# Patient Record
Sex: FEMALE | Race: WHITE | NOT HISPANIC OR LATINO | URBAN - METROPOLITAN AREA
[De-identification: names, ages, dates, MRNs, and addresses within clinical notes are randomized per-mention and may not be internally consistent; named-entity substitution may affect disease eponyms.]

---

## 2017-01-14 ENCOUNTER — OUTPATIENT (OUTPATIENT)
Dept: OUTPATIENT SERVICES | Facility: HOSPITAL | Age: 82
LOS: 1 days | End: 2017-01-14
Payer: MEDICARE

## 2017-01-14 DIAGNOSIS — T81.89XD OTHER COMPLICATIONS OF PROCEDURES, NOT ELSEWHERE CLASSIFIED, SUBSEQUENT ENCOUNTER: ICD-10-CM

## 2017-01-14 DIAGNOSIS — Z90.710 ACQUIRED ABSENCE OF BOTH CERVIX AND UTERUS: Chronic | ICD-10-CM

## 2017-01-14 DIAGNOSIS — K46.9 UNSPECIFIED ABDOMINAL HERNIA WITHOUT OBSTRUCTION OR GANGRENE: Chronic | ICD-10-CM

## 2017-01-14 PROCEDURE — G0463: CPT

## 2017-01-17 DIAGNOSIS — Z79.899 OTHER LONG TERM (CURRENT) DRUG THERAPY: ICD-10-CM

## 2017-01-17 DIAGNOSIS — Z85.41 PERSONAL HISTORY OF MALIGNANT NEOPLASM OF CERVIX UTERI: ICD-10-CM

## 2017-01-17 DIAGNOSIS — Z88.0 ALLERGY STATUS TO PENICILLIN: ICD-10-CM

## 2017-01-17 DIAGNOSIS — Z90.710 ACQUIRED ABSENCE OF BOTH CERVIX AND UTERUS: ICD-10-CM

## 2017-01-17 DIAGNOSIS — Z87.891 PERSONAL HISTORY OF NICOTINE DEPENDENCE: ICD-10-CM

## 2017-01-17 DIAGNOSIS — Z79.82 LONG TERM (CURRENT) USE OF ASPIRIN: ICD-10-CM

## 2017-01-17 DIAGNOSIS — Z98.890 OTHER SPECIFIED POSTPROCEDURAL STATES: ICD-10-CM

## 2017-01-17 DIAGNOSIS — Z87.19 PERSONAL HISTORY OF OTHER DISEASES OF THE DIGESTIVE SYSTEM: ICD-10-CM

## 2017-01-17 DIAGNOSIS — Z91.048 OTHER NONMEDICINAL SUBSTANCE ALLERGY STATUS: ICD-10-CM

## 2017-01-17 DIAGNOSIS — I10 ESSENTIAL (PRIMARY) HYPERTENSION: ICD-10-CM

## 2017-01-17 DIAGNOSIS — K43.2 INCISIONAL HERNIA WITHOUT OBSTRUCTION OR GANGRENE: ICD-10-CM

## 2017-01-17 DIAGNOSIS — H35.30 UNSPECIFIED MACULAR DEGENERATION: ICD-10-CM
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
## 2017-01-17 DIAGNOSIS — T81.89XD OTHER COMPLICATIONS OF PROCEDURES, NOT ELSEWHERE CLASSIFIED, SUBSEQUENT ENCOUNTER: ICD-10-CM

## 2017-01-17 DIAGNOSIS — E03.9 HYPOTHYROIDISM, UNSPECIFIED: ICD-10-CM

## 2017-01-17 DIAGNOSIS — Z82.49 FAMILY HISTORY OF ISCHEMIC HEART DISEASE AND OTHER DISEASES OF THE CIRCULATORY SYSTEM: ICD-10-CM

## 2017-01-17 DIAGNOSIS — F34.1 DYSTHYMIC DISORDER: ICD-10-CM

## 2017-01-17 DIAGNOSIS — Z85.79 PERSONAL HISTORY OF OTHER MALIGNANT NEOPLASMS OF LYMPHOID, HEMATOPOIETIC AND RELATED TISSUES: ICD-10-CM

## 2018-10-07 ENCOUNTER — EMERGENCY (EMERGENCY)
Facility: HOSPITAL | Age: 83
LOS: 1 days | Discharge: NURSING, REHAB OR LONG-TERM CARE | End: 2018-10-07
Attending: EMERGENCY MEDICINE | Admitting: EMERGENCY MEDICINE
Payer: MEDICARE

## 2018-10-07 VITALS
SYSTOLIC BLOOD PRESSURE: 146 MMHG | HEART RATE: 88 BPM | TEMPERATURE: 98 F | HEIGHT: 70 IN | WEIGHT: 147.05 LBS | OXYGEN SATURATION: 99 % | RESPIRATION RATE: 12 BRPM | DIASTOLIC BLOOD PRESSURE: 80 MMHG

## 2018-10-07 VITALS
OXYGEN SATURATION: 95 % | DIASTOLIC BLOOD PRESSURE: 87 MMHG | HEART RATE: 71 BPM | SYSTOLIC BLOOD PRESSURE: 151 MMHG | TEMPERATURE: 99 F | RESPIRATION RATE: 13 BRPM

## 2018-10-07 DIAGNOSIS — Z90.710 ACQUIRED ABSENCE OF BOTH CERVIX AND UTERUS: Chronic | ICD-10-CM

## 2018-10-07 DIAGNOSIS — K46.9 UNSPECIFIED ABDOMINAL HERNIA WITHOUT OBSTRUCTION OR GANGRENE: Chronic | ICD-10-CM

## 2018-10-07 PROCEDURE — 99284 EMERGENCY DEPT VISIT MOD MDM: CPT

## 2018-10-07 PROCEDURE — 70450 CT HEAD/BRAIN W/O DYE: CPT | Mod: 26

## 2018-10-07 PROCEDURE — 71101 X-RAY EXAM UNILAT RIBS/CHEST: CPT | Mod: 26

## 2018-10-07 PROCEDURE — 70450 CT HEAD/BRAIN W/O DYE: CPT

## 2018-10-07 PROCEDURE — 99284 EMERGENCY DEPT VISIT MOD MDM: CPT | Mod: 25

## 2018-10-07 PROCEDURE — 71101 X-RAY EXAM UNILAT RIBS/CHEST: CPT

## 2018-10-07 RX ORDER — ACETAMINOPHEN 500 MG
650 TABLET ORAL ONCE
Qty: 0 | Refills: 0 | Status: COMPLETED | OUTPATIENT
Start: 2018-10-07 | End: 2018-10-07

## 2018-10-07 RX ADMIN — Medication 650 MILLIGRAM(S): at 21:43

## 2018-10-07 NOTE — ED PROVIDER NOTE - CHPI ED SYMPTOMS NEG
no dizziness/no confusion/no change in level of consciousness/no vomiting/no blurred vision/no loss of consciousness/no nausea

## 2018-10-07 NOTE — ED PROVIDER NOTE - MEDICAL DECISION MAKING DETAILS
small hematoma to back of head after fall. denies headache or dizziness. does not take any blood thinners. will head CT. will also x-ray ribs due to mild rib tenderness. no pain at rest. declines pain meds

## 2018-10-07 NOTE — ED PROVIDER NOTE - ATTENDING CONTRIBUTION TO CARE
89 y.o. F [facility] fall 89 y.o. F [facility] for fall, had finished dinner, was trying to get up, pushed chair back and slipped, fell back and hit head, no LOC, no n/v, at baseline mental status, c/o pain back of head and left side rib cage, hurts to take a deep breath, no other complaints; on exam pt is wd, wn, nad; heent - hematoma posterior scalp, no sign facial trauma, PERRL, EOMI; chest - cta, no w/r/r; cv - rrr, no m/r/g; msk - no spinal TTP, no extremity ttp, FROM, normal gait, +ttp left lateral lower chest wall, no crepitus; skin - intact; psych - calm, cooperative; neuro - A&O, follows commands, motor/sensation intact; A/P - fall with hematoma to scalp and bruising to left ribs - ct head negative, cxr negative - d/w family official xr read will be done in the morning, will take tylenol prn, f/u pcp

## 2018-10-07 NOTE — ED PROVIDER NOTE - PROGRESS NOTE DETAILS
resting comfortably. waiting for CT results. no complaints Reevaluated patient at bedside.  no complaints.  Discussed the results of all diagnostic testing in ED and copies of all reports given.   An opportunity to ask questions was given.  Discussed the importance of prompt, close medical follow-up.  Patient will return with any changes, concerns or persistent / worsening symptoms.  Understanding of all instructions verbalized.

## 2018-10-07 NOTE — ED PROVIDER NOTE - CARDIAC, MLM
Normal rate, regular rhythm. mild well localized left sided rib tenderness. no crepitus or ecchymosis

## 2018-10-07 NOTE — ED PROVIDER NOTE - CARE PLAN
Principal Discharge DX:	Closed head injury, initial encounter  Secondary Diagnosis:	Rib contusion, left, initial encounter

## 2018-10-11 DIAGNOSIS — S00.93XA CONTUSION OF UNSPECIFIED PART OF HEAD, INITIAL ENCOUNTER: ICD-10-CM

## 2020-06-24 ENCOUNTER — EMERGENCY (EMERGENCY)
Facility: HOSPITAL | Age: 85
LOS: 1 days | Discharge: HOME | End: 2020-06-24
Attending: EMERGENCY MEDICINE | Admitting: EMERGENCY MEDICINE
Payer: MEDICARE

## 2020-06-24 VITALS
HEART RATE: 100 BPM | HEIGHT: 62 IN | TEMPERATURE: 98 F | SYSTOLIC BLOOD PRESSURE: 144 MMHG | DIASTOLIC BLOOD PRESSURE: 78 MMHG | WEIGHT: 110.01 LBS | RESPIRATION RATE: 18 BRPM | OXYGEN SATURATION: 97 %

## 2020-06-24 DIAGNOSIS — K46.9 UNSPECIFIED ABDOMINAL HERNIA WITHOUT OBSTRUCTION OR GANGRENE: Chronic | ICD-10-CM

## 2020-06-24 DIAGNOSIS — Z90.710 ACQUIRED ABSENCE OF BOTH CERVIX AND UTERUS: Chronic | ICD-10-CM

## 2020-06-24 LAB
ALBUMIN SERPL ELPH-MCNC: 3.3 G/DL — SIGNIFICANT CHANGE UP (ref 3.3–5)
ALP SERPL-CCNC: 71 U/L — SIGNIFICANT CHANGE UP (ref 40–120)
ALT FLD-CCNC: 14 U/L — SIGNIFICANT CHANGE UP (ref 12–78)
ANION GAP SERPL CALC-SCNC: 8 MMOL/L — SIGNIFICANT CHANGE UP (ref 5–17)
AST SERPL-CCNC: 12 U/L — LOW (ref 15–37)
BILIRUB SERPL-MCNC: 0.5 MG/DL — SIGNIFICANT CHANGE UP (ref 0.2–1.2)
BUN SERPL-MCNC: 11 MG/DL — SIGNIFICANT CHANGE UP (ref 7–23)
CALCIUM SERPL-MCNC: 8.7 MG/DL — SIGNIFICANT CHANGE UP (ref 8.5–10.1)
CHLORIDE SERPL-SCNC: 107 MMOL/L — SIGNIFICANT CHANGE UP (ref 96–108)
CK SERPL-CCNC: 23 U/L — LOW (ref 26–192)
CO2 SERPL-SCNC: 25 MMOL/L — SIGNIFICANT CHANGE UP (ref 22–31)
CREAT SERPL-MCNC: 0.58 MG/DL — SIGNIFICANT CHANGE UP (ref 0.5–1.3)
GLUCOSE SERPL-MCNC: 111 MG/DL — HIGH (ref 70–99)
HCT VFR BLD CALC: 34.6 % — SIGNIFICANT CHANGE UP (ref 34.5–45)
HGB BLD-MCNC: 10.4 G/DL — LOW (ref 11.5–15.5)
MAGNESIUM SERPL-MCNC: 1.5 MG/DL — LOW (ref 1.6–2.6)
MCHC RBC-ENTMCNC: 23.2 PG — LOW (ref 27–34)
MCHC RBC-ENTMCNC: 30.1 GM/DL — LOW (ref 32–36)
MCV RBC AUTO: 77.1 FL — LOW (ref 80–100)
NRBC # BLD: 0 /100 WBCS — SIGNIFICANT CHANGE UP (ref 0–0)
PLATELET # BLD AUTO: 74 K/UL — LOW (ref 150–400)
POTASSIUM SERPL-MCNC: 4.3 MMOL/L — SIGNIFICANT CHANGE UP (ref 3.5–5.3)
POTASSIUM SERPL-SCNC: 4.3 MMOL/L — SIGNIFICANT CHANGE UP (ref 3.5–5.3)
PROT SERPL-MCNC: 6.8 G/DL — SIGNIFICANT CHANGE UP (ref 6–8.3)
RBC # BLD: 4.49 M/UL — SIGNIFICANT CHANGE UP (ref 3.8–5.2)
RBC # FLD: 24 % — HIGH (ref 10.3–14.5)
SODIUM SERPL-SCNC: 140 MMOL/L — SIGNIFICANT CHANGE UP (ref 135–145)
TSH SERPL-MCNC: 0.84 UIU/ML — SIGNIFICANT CHANGE UP (ref 0.36–3.74)
WBC # BLD: 4.92 K/UL — SIGNIFICANT CHANGE UP (ref 3.8–10.5)
WBC # FLD AUTO: 4.92 K/UL — SIGNIFICANT CHANGE UP (ref 3.8–10.5)

## 2020-06-24 PROCEDURE — 84480 ASSAY TRIIODOTHYRONINE (T3): CPT

## 2020-06-24 PROCEDURE — 72125 CT NECK SPINE W/O DYE: CPT

## 2020-06-24 PROCEDURE — 99284 EMERGENCY DEPT VISIT MOD MDM: CPT

## 2020-06-24 PROCEDURE — 72125 CT NECK SPINE W/O DYE: CPT | Mod: 26

## 2020-06-24 PROCEDURE — 99285 EMERGENCY DEPT VISIT HI MDM: CPT

## 2020-06-24 PROCEDURE — 84436 ASSAY OF TOTAL THYROXINE: CPT

## 2020-06-24 PROCEDURE — 70450 CT HEAD/BRAIN W/O DYE: CPT

## 2020-06-24 PROCEDURE — 93005 ELECTROCARDIOGRAM TRACING: CPT

## 2020-06-24 PROCEDURE — 82550 ASSAY OF CK (CPK): CPT

## 2020-06-24 PROCEDURE — 36415 COLL VENOUS BLD VENIPUNCTURE: CPT

## 2020-06-24 PROCEDURE — 99284 EMERGENCY DEPT VISIT MOD MDM: CPT | Mod: 25

## 2020-06-24 PROCEDURE — 80053 COMPREHEN METABOLIC PANEL: CPT

## 2020-06-24 PROCEDURE — 85027 COMPLETE CBC AUTOMATED: CPT

## 2020-06-24 PROCEDURE — 93010 ELECTROCARDIOGRAM REPORT: CPT

## 2020-06-24 PROCEDURE — 71250 CT THORAX DX C-: CPT | Mod: 26

## 2020-06-24 PROCEDURE — 84443 ASSAY THYROID STIM HORMONE: CPT

## 2020-06-24 PROCEDURE — 83735 ASSAY OF MAGNESIUM: CPT

## 2020-06-24 PROCEDURE — 71250 CT THORAX DX C-: CPT

## 2020-06-24 PROCEDURE — 70450 CT HEAD/BRAIN W/O DYE: CPT | Mod: 26

## 2020-06-24 RX ORDER — ACETAMINOPHEN 500 MG
650 TABLET ORAL ONCE
Refills: 0 | Status: DISCONTINUED | OUTPATIENT
Start: 2020-06-24 | End: 2020-06-28

## 2020-06-24 NOTE — ED ADULT NURSE NOTE - OBJECTIVE STATEMENT
patient comes to ED from Assisted Living Facility awake alert oriented to person place situation patient reports falling was walking with her walker in her room and lost her footing and fell pt has hematoma to top of head denies any other injuries moves all extremities well denies loss of consciousness

## 2020-06-24 NOTE — ED ADULT NURSE NOTE - NSIMPLEMENTINTERV_GEN_ALL_ED
Implemented All Fall with Harm Risk Interventions:  [place] to call system. Call bell, personal items and telephone within reach. Instruct patient to call for assistance. Room bathroom lighting operational. Non-slip footwear when patient is off stretcher. Physically safe environment: no spills, clutter or unnecessary equipment. Stretcher in lowest position, wheels locked, appropriate side rails in place. Provide visual cue, wrist band, yellow gown, etc. Monitor gait and stability. Monitor for mental status changes and reorient to person, place, and time. Review medications for side effects contributing to fall risk. Reinforce activity limits and safety measures with patient and family. Provide visual clues: red socks.

## 2020-06-24 NOTE — ED PROVIDER NOTE - CARE PROVIDER_API CALL
Carlos Eduardo Daniel  INTERNAL MEDICINE  [address] [place] [address]  Phone: [phone]  Fax: [phone]  Follow Up Time:

## 2020-06-24 NOTE — ED PROVIDER NOTE - OBJECTIVE STATEMENT
89 yo female with h/o HTN, ESRD, OA from [facility]IB[facility] s/p fall today. As per RN at assisted living [facility] patient had an unwitnessed fall, + head trauma, unknown LOC. PAtient was found by aide sitting on floor next to her chair, on floor approx 1 hour. As per patient she tripped and fell. Patient c/o right posterior rib pain. Denies headache, dizziness, visual changes, chest pain, sob, abd pain, N/V, UE/LE weakness or paresthesias.  No lacerations or abrasions. + hematoma to scalp. As per EMS - ekg shows A fib, no known history of a fib     pmd: Dr. Russell

## 2020-06-24 NOTE — CONSULT NOTE ADULT - SUBJECTIVE AND OBJECTIVE BOX
[facility] - Juan Diego Manjarrez, Yan Gonzales, María, Luis, Shiraz Tran  Office Number: [phone]    Initial Consult Note  CHIEF COMPLAINT: Patient is a 89y old  Female who presents with a chief complaint of fall  HPI: 89 yo PMH HTN, HLD, depression, GERD presented with falls x 2 in assisted nursing facility over the past day. Patient does not remember feeling dizzy or lightheaded. Patient remembers hitting head and does have mild pain on top of head as well as right side of chest. No neck, back or abdominal pains. Denies any chest pain, SOB or syncope.     Allergies  penicillin (Other)    PAST MEDICAL & SURGICAL HISTORY:  Depression  GERD (gastroesophageal reflux disease)  Hypothyroid  Hypertension  Hypertension  Abdominal hernia: surgeries x4  H/O total hysterectomy    MEDICATIONS  (STANDING):    MEDICATIONS  (PRN):    FAMILY HISTORY:  No pertinent family history in first degree relatives    SOCIAL HISTORY  Marital Status:   Occupation:   Lives with:     SUBSTANCE USE  Tobacco Usage:  ( ) None ( ) never smoked   ( ) former smoker  ( ) current smoker; Packs per day:   Alcohol Usage: ( ) none  ( ) occasional ( ) 2-3 times a week ( ) daily; Last drink:   Recreational drugs ( ) None    REVIEW OF SYSTEMS   CONSTITUTIONAL: No fevers, No chills, No fatigue, No weight gain  EYES: No vision changes, No vertigo, No throat pain   ENT: No congestion, No ear pain, No sore throat.  NECK: No pain, No stiffness  RESPIRATORY: No shortness of breath, No cough, No wheezing, No hemoptysis  CARDIOVASCULAR: No chest pain. No palpitations, No PARKER, No orthopnea, No PND, No pleuritic pain  GASTROINTESTINAL: No abdominal pain, No nausea, No vomiting, No hematemesis, No diarrhea No constipation. No melena  GENITOURINARY: No dysuria, No frequency, No incontinence, No hematuria  NEUROLOGICAL: No dizziness, No lightheadedness, No syncope, No LOC, No headache, No numbness, No weakness  MUSCULOSKELETAL: No joint pain, No joint swelling.  PSYCHIATRIC: No anxiety, No depression  DERMATOLOGY: No diaphoresis. No itching, No rashes, No pressure ulcers  HEME/LYMPH: No easy bruising, or bleeding gums  All other review of systems is negative unless indicated above.    VITAL SIGNS:   Vital Signs Last 24 Hrs  T(C): 36.7 (24 Jun 2020 16:02), Max: 36.7 (24 Jun 2020 16:02)  T(F): 98 (24 Jun 2020 16:02), Max: 98 (24 Jun 2020 16:02)  HR: 100 (24 Jun 2020 16:02) (100 - 100)  BP: 144/78 (24 Jun 2020 16:02) (144/78 - 144/78)  BP(mean): --  RR: 18 (24 Jun 2020 16:02) (18 - 18)  SpO2: 97% (24 Jun 2020 16:02) (97% - 97%)    Physical Exam:  Appearance: NAD, no distress, alert,   HEENT: Moist Mucous Membranes, Anicteric  Cardiovascular: Regular rate and rhythm, Normal S1 S2, No JVD, No murmurs, No rubs, gallops or clicks  Respiratory: Lungs clear to auscultation. No rales, No rhonchi, No wheezing. No tenderness to palpation  Gastrointestinal:  Soft, Non-tender, + BS  Neurologic: Non-focal  Skin: Warm and dry, No rashes, No ecchymosis, No cyanosis, No ulcers   Musculoskeletal: No clubbing, No cyanosis  Vascular: Peripheral pulses palpable 2+ bilaterally  Psychiatry: Mood & affect appropriate  Lymph: No peripheral edema.     LABS: [facility] - Juan Diego Manjarrez, Janet, Yan, María, Luis, Shiraz Tran  Office Number: [phone]    Initial Consult Note  CHIEF COMPLAINT: Patient is a 89y old  Female who presents with a chief complaint of fall  HPI: 89 yo PMH HTN, HLD, depression, GERD presented with falls x 2 in assisted nursing facility over the past day. Patient was walking with a walker and fell. Patient does not remember feeling dizzy or lightheaded. She is legally blind and ia [place]. Patient remembers hitting head and does have mild pain on top of head as well as right side of chest. No neck, back or abdominal pains. Denies any chest pain, SOB or syncope. Unsure if fall is mechanical. Patient accompanied by daughter Jeanne who stated her mother was in the living facility and was confused after started on Remeron Remeron is off, but patient would get confused intermittently.   In ER, T(F): 98 HR: 100 BP: 144/78 RR: 18 SpO2: 97% EKG showed Atrial fibrillation @ 100. No h/o A fib per daughter. HR in telemetry ....   Patient on atenolol at [facility] 25 mg. CT head, chest and c-spine pending     Allergies  penicillin (Other)    PAST MEDICAL & SURGICAL HISTORY:  Depression  GERD (gastroesophageal reflux disease)  Hypothyroid  Hypertension  Hypertension  Abdominal hernia: surgeries x4  H/O total hysterectomy    MEDICATIONS  (STANDING):    MEDICATIONS  (PRN):    FAMILY HISTORY:  No pertinent family history in first degree relatives    REVIEW OF SYSTEMS   CONSTITUTIONAL: No fevers, No chills, No fatigue, No weight gain  EYES: No vision changes, No vertigo, No throat pain   ENT: No congestion, No ear pain, No sore throat.  NECK: No pain, No stiffness  RESPIRATORY: No shortness of breath, No cough, No wheezing, No hemoptysis  CARDIOVASCULAR: No chest pain. No palpitations, No PARKER, No orthopnea, No PND, No pleuritic pain  GASTROINTESTINAL: No abdominal pain, No nausea, No vomiting, No hematemesis, No diarrhea No constipation. No melena  GENITOURINARY: No dysuria, No frequency, No incontinence, No hematuria  NEUROLOGICAL: No dizziness, No lightheadedness, No syncope, No LOC, No headache, No numbness, No weakness  MUSCULOSKELETAL: No joint pain, No joint swelling.  PSYCHIATRIC: No anxiety, No depression  DERMATOLOGY: No diaphoresis. No itching, No rashes, No pressure ulcers  HEME/LYMPH: No easy bruising, or bleeding gums  All other review of systems is negative unless indicated above.    VITAL SIGNS:   Vital Signs Last 24 Hrs  T(C): 36.7 (24 Jun 2020 16:02), Max: 36.7 (24 Jun 2020 16:02)  T(F): 98 (24 Jun 2020 16:02), Max: 98 (24 Jun 2020 16:02)  HR: 100 (24 Jun 2020 16:02) (100 - 100)  BP: 144/78 (24 Jun 2020 16:02) (144/78 - 144/78)  BP(mean): --  RR: 18 (24 Jun 2020 16:02) (18 - 18)  SpO2: 97% (24 Jun 2020 16:02) (97% - 97%)    Physical Exam:  Appearance: NAD, no distress, alert,   HEENT: Moist Mucous Membranes, Anicteric  Cardiovascular: Regular rate and rhythm, Normal S1 S2, No JVD, No murmurs, No rubs, gallops or clicks  Respiratory: Lungs clear to auscultation. No rales, No rhonchi, No wheezing. No tenderness to palpation  Gastrointestinal:  Soft, Non-tender, + BS  Neurologic: Non-focal  Skin: Warm and dry, No rashes, No ecchymosis, No cyanosis, No ulcers   Musculoskeletal: No clubbing, No cyanosis  Vascular: Peripheral pulses palpable 2+ bilaterally  Psychiatry: Mood & affect appropriate  Lymph: No peripheral edema.     LABS: [facility] - Juan Diego Manjarrez, Janet, Yan, María, Luis, Chelsea, Shiraz  Office Number: [phone]    Initial Consult Note  CHIEF COMPLAINT: Patient is a 89y old  Female who presents with a chief complaint of fall  HPI: 89 yo PMH HTN, HLD, depression, GERD presented with falls x 2 in assisted nursing facility over the past day. Patient was walking with a walker and fell, unwitnessed. Patient does not remember feeling dizzy or lightheaded. She is legally blind and ia [place]. Patient remembers hitting head and does have mild pain and swelling on top of head as well as pain on right side of chest. No neck, back or abdominal pains. Denies any chest pain, SOB or syncope. Unsure if fall is mechanical. Patient accompanied by daughter Jeanne who stated her mother was in the living facility and was confused after started on Remeron Remeron is off, but patient would get confused intermittently.     In ER, T(F): 98 HR: 100 BP: 144/78 RR: 18 SpO2: 97% EKG showed Atrial fibrillation @ 100. No h/o A fib per daughter. HR in telemetry 80-100s    Patient on atenolol at [facility] 25 mg. CT head, chest and c-spine pending     Allergies  penicillin (Other)    PAST MEDICAL & SURGICAL HISTORY:  Depression  GERD (gastroesophageal reflux disease)  Hypothyroid  Hypertension  Hypertension  Abdominal hernia: surgeries x4  H/O total hysterectomy    MEDICATIONS  (STANDING):    MEDICATIONS  (PRN):    FAMILY HISTORY:  No pertinent family history in first degree relatives    REVIEW OF SYSTEMS   CONSTITUTIONAL: No fevers, No chills, No fatigue, No weight gain  EYES: No vision changes, No vertigo, No throat pain   ENT: No congestion, No ear pain, No sore throat.  NECK: No pain, No stiffness  RESPIRATORY: No shortness of breath, No cough, No wheezing, No hemoptysis  CARDIOVASCULAR: No chest pain. No palpitations, No PARKER, No orthopnea, No PND, No pleuritic pain  GASTROINTESTINAL: No abdominal pain, No nausea, No vomiting, No hematemesis, No diarrhea No constipation. No melena  GENITOURINARY: No dysuria, No frequency, No incontinence, No hematuria  NEUROLOGICAL: No dizziness, No lightheadedness, No syncope, No LOC, No headache, No numbness, No weakness  MUSCULOSKELETAL: No joint pain, No joint swelling.  PSYCHIATRIC: No anxiety, No depression  DERMATOLOGY: No diaphoresis. No itching, No rashes, No pressure ulcers  HEME/LYMPH: No easy bruising, or bleeding gums  All other review of systems is negative unless indicated above.    VITAL SIGNS:   Vital Signs Last 24 Hrs  T(C): 36.7 (24 Jun 2020 16:02), Max: 36.7 (24 Jun 2020 16:02)  T(F): 98 (24 Jun 2020 16:02), Max: 98 (24 Jun 2020 16:02)  HR: 100 (24 Jun 2020 16:02) (100 - 100)  BP: 144/78 (24 Jun 2020 16:02) (144/78 - 144/78)  BP(mean): --  RR: 18 (24 Jun 2020 16:02) (18 - 18)  SpO2: 97% (24 Jun 2020 16:02) (97% - 97%)    Physical Exam:  Appearance: NAD, no distress, alert,   HEENT: Moist Mucous Membranes, Anicteric  Cardiovascular: Regular rate and rhythm, Normal S1 S2, No JVD, No murmurs, No rubs, gallops or clicks  Respiratory: Lungs clear to auscultation. No rales, No rhonchi, No wheezing. No tenderness to palpation  Gastrointestinal:  Soft, Non-tender, + BS  Neurologic: Non-focal  Skin: Warm and dry, No rashes, No ecchymosis, No cyanosis, No ulcers   Musculoskeletal: No clubbing, No cyanosis  Vascular: Peripheral pulses palpable 2+ bilaterally  Psychiatry: Mood & affect appropriate  Lymph: No peripheral edema.     LABS: [facility] - Juan Diego Manjarrez, Janet, Yan, María, Luis, Chelsea, Shiraz  Office Number: [phone]    Initial Consult Note  CHIEF COMPLAINT: Patient is a 89y old  Female who presents with a chief complaint of fall  HPI: 89 yo PMH HTN, HLD, depression, GERD presented with falls x 2 in assisted nursing facility over the past day. Patient was walking with a walker and fell, unwitnessed. Patient does not remember feeling dizzy or lightheaded. She is legally blind and ia [place]. Patient remembers hitting head and does have mild pain and swelling on top of head as well as pain on right side of chest. No neck, back or abdominal pains. Denies any chest pain, SOB or syncope. Unsure if fall is mechanical. Patient accompanied by daughter Jeanne who stated her mother was in the living facility and was confused after started on Remeron Remeron is off, but patient would get confused intermittently.     In ER, T(F): 98 HR: 100 BP: 144/78 RR: 18 SpO2: 97% EKG showed Atrial fibrillation @ 100. No h/o A fib per daughter. HR in telemetry 80-100s    Patient on atenolol at [facility] 25 mg. CT head, chest and c-spine pending     Allergies  penicillin (Other)    PAST MEDICAL & SURGICAL HISTORY:  Depression  GERD (gastroesophageal reflux disease)  Hypothyroid  Hypertension  Hypertension  Abdominal hernia: surgeries x4  H/O total hysterectomy    MEDICATIONS  (STANDING):    MEDICATIONS  (PRN):    FAMILY HISTORY:  No pertinent family history in first degree relatives    REVIEW OF SYSTEMS   CONSTITUTIONAL: No fevers, No chills, No fatigue, No weight gain  EYES: No vision changes, No vertigo, No throat pain   ENT: No congestion, No ear pain, No sore throat.  NECK: No pain, No stiffness  RESPIRATORY: No shortness of breath, No cough, No wheezing, No hemoptysis  CARDIOVASCULAR: No chest pain. No palpitations, No PARKER, No orthopnea, No PND, No pleuritic pain  GASTROINTESTINAL: No abdominal pain, No nausea, No vomiting, No hematemesis, No diarrhea No constipation. No melena  GENITOURINARY: No dysuria, No frequency, No incontinence, No hematuria  NEUROLOGICAL: No dizziness, No lightheadedness, No syncope, No LOC, No headache, No numbness, No weakness  MUSCULOSKELETAL: No joint pain, No joint swelling.  PSYCHIATRIC: No anxiety, No depression  DERMATOLOGY: No diaphoresis. No itching, No rashes, No pressure ulcers  HEME/LYMPH: No easy bruising, or bleeding gums  All other review of systems is negative unless indicated above.    VITAL SIGNS:   Vital Signs Last 24 Hrs  T(C): 36.7 (24 Jun 2020 16:02), Max: 36.7 (24 Jun 2020 16:02)  T(F): 98 (24 Jun 2020 16:02), Max: 98 (24 Jun 2020 16:02)  HR: 100 (24 Jun 2020 16:02) (100 - 100)  BP: 144/78 (24 Jun 2020 16:02) (144/78 - 144/78)  BP(mean): --  RR: 18 (24 Jun 2020 16:02) (18 - 18)  SpO2: 97% (24 Jun 2020 16:02) (97% - 97%)    Physical Exam:  Appearance: NAD, no distress, alert,   HEENT: Moist Mucous Membranes, Anicteric  Cardiovascular: Regular rate and rhythm, Normal S1 S2, No JVD, No murmurs, No rubs, gallops or clicks  Respiratory: Lungs clear to auscultation. No rales, No rhonchi, No wheezing. No tenderness to palpation  Gastrointestinal:  Soft, Non-tender, + BS  Neurologic: Non-focal  Skin: Warm and dry, No rashes, No ecchymosis, No cyanosis, No ulcers   Musculoskeletal: No clubbing, No cyanosis  Vascular: Peripheral pulses palpable 2+ bilaterally  Psychiatry: Mood & affect appropriate  Lymph: No peripheral edema.     LABS:                         10.4   4.92  )-----------( 74       ( 24 Jun 2020 16:24 )             34.6     24 Jun 2020 16:24    140    |  107    |  11     ----------------------------<  111    4.3     |  25     |  0.58     Ca    8.7        24 Jun 2020 16:24  Mg     1.5       24 Jun 2020 16:24    TPro  6.8    /  Alb  3.3    /  TBili  0.5    /  DBili  x      /  AST  12     /  ALT  14     /  AlkPhos  71     24 Jun 2020 16:24    LIVER FUNCTIONS - ( 24 Jun 2020 16:24 )  Alb: 3.3 g/dL / Pro: 6.8 g/dL / ALK PHOS: 71 U/L / ALT: 14 U/L / AST: 12 U/L / GGT: x           CARDIAC MARKERS:  Creatine Kinase, Serum: 23 U/L (06-24 @ 16:24)  Thyroid Stimulating Hormone, Serum: 0.84 uIU/mL (06-24 @ 16:24) [facility] - Juan Diego Manjarrez, Janet, Yan, María, Luis, Shiraz Tran  Office Number: [phone]    Initial Consult Note  CHIEF COMPLAINT: Patient is a 89y old  Female who presents with a chief complaint of fall  HPI: 89 yo PMH HTN, HLD, depression, GERD presented with falls x 2 in assisted nursing facility over the past day. Patient was walking with a walker and fell, unwitnessed. Patient does not remember feeling dizzy or lightheaded. She is legally blind and ia [place]. Patient remembers hitting head and does have mild pain and swelling on top of head as well as pain on right side of chest. No neck, back or abdominal pains. Denies any chest pain, SOB or syncope. Unsure if fall is mechanical. Patient accompanied by daughter Jeanne who stated her mother was in the living facility and was confused after started on Remeron Remeron is off, but patient would get confused intermittently.     In ER, T(F): 98 HR: 100 BP: 144/78 RR: 18 SpO2: 97% EKG showed Atrial fibrillation @ 100. No h/o A fib per daughter. HR in telemetry 90-100s   Patient on atenolol 25 mg at shelter. CT head, chest and c-spine pending. Labs unremarkable     Allergies  penicillin (Other)    PAST MEDICAL & SURGICAL HISTORY:  Depression  GERD (gastroesophageal reflux disease)  Hypothyroid  Hypertension  Hypertension  Abdominal hernia: surgeries x4  H/O total hysterectomy    MEDICATIONS  (STANDING):    MEDICATIONS  (PRN):    FAMILY HISTORY:  No pertinent family history in first degree relatives    REVIEW OF SYSTEMS   CONSTITUTIONAL: No fevers, No chills, No fatigue, No weight gain  EYES: No vision changes, No vertigo, No throat pain   ENT: No congestion, No ear pain, No sore throat.  NECK: No pain, No stiffness  RESPIRATORY: No shortness of breath, No cough, No wheezing, No hemoptysis  CARDIOVASCULAR: + right posterior chest wall pain and ttp No chest pain. No palpitations, No PARKER, No orthopnea, No PND, No pleuritic pain  GASTROINTESTINAL: No abdominal pain, No nausea, No vomiting, No hematemesis, No diarrhea No constipation. No melena  GENITOURINARY: No dysuria, No frequency, No incontinence, No hematuria  NEUROLOGICAL: No dizziness, No lightheadedness, No syncope, No LOC, No headache, No numbness, No weakness  MUSCULOSKELETAL: No joint pain, No joint swelling.  PSYCHIATRIC: No anxiety, No depression  DERMATOLOGY: No diaphoresis. No itching, No rashes, No pressure ulcers  HEME/LYMPH: No easy bruising, or bleeding gums  All other review of systems is negative unless indicated above.    VITAL SIGNS:   Vital Signs Last 24 Hrs  T(C): 36.7 (24 Jun 2020 16:02), Max: 36.7 (24 Jun 2020 16:02)  T(F): 98 (24 Jun 2020 16:02), Max: 98 (24 Jun 2020 16:02)  HR: 100 (24 Jun 2020 16:02) (100 - 100)  BP: 144/78 (24 Jun 2020 16:02) (144/78 - 144/78)  BP(mean): --  RR: 18 (24 Jun 2020 16:02) (18 - 18)  SpO2: 97% (24 Jun 2020 16:02) (97% - 97%)    Physical Exam:  Appearance: NAD, no distress, alert, frail  HEENT: Moist Mucous Membranes, Anicteric  Cardiovascular: Irregular rate and rhythm, Normal S1 S2, No JVD, No murmurs, No rubs, gallops or clicks  Respiratory: Rt posterior chest wall tenderness, Lungs clear to auscultation. No rales, No rhonchi, No wheezing. No tenderness to palpation  Gastrointestinal:  + hernia Soft, Non-tender, + BS  Neurologic: Non-focal  Skin: Warm and dry, No rashes, No ecchymosis, No cyanosis, No ulcers   Musculoskeletal: No clubbing, No cyanosis  Vascular: Peripheral pulses palpable 2+ bilaterally  Psychiatry: Mood & affect appropriate  Lymph: No peripheral edema.     LABS:                         10.4   4.92  )-----------( 74       ( 24 Jun 2020 16:24 )             34.6     24 Jun 2020 16:24    140    |  107    |  11     ----------------------------<  111    4.3     |  25     |  0.58     Ca    8.7        24 Jun 2020 16:24  Mg     1.5       24 Jun 2020 16:24    TPro  6.8    /  Alb  3.3    /  TBili  0.5    /  DBili  x      /  AST  12     /  ALT  14     /  AlkPhos  71     24 Jun 2020 16:24    LIVER FUNCTIONS - ( 24 Jun 2020 16:24 )  Alb: 3.3 g/dL / Pro: 6.8 g/dL / ALK PHOS: 71 U/L / ALT: 14 U/L / AST: 12 U/L / GGT: x           CARDIAC MARKERS:  Creatine Kinase, Serum: 23 U/L (06-24 @ 16:24)  Thyroid Stimulating Hormone, Serum: 0.84 uIU/mL (06-24 @ 16:24)

## 2020-06-24 NOTE — CONSULT NOTE ADULT - ASSESSMENT
89 yo PMH HTN, HLD, depression, GERD presented with falls x 2 in assisted nursing facility over the past day, found to be in new onset rate controlled atrial fibrillation.     Atrial Fibrillation  - Patient with Afib, rate controlled at present   - New onset, unknown duration  - No Afib and is on atenolol 25 mg at home for HTN  - Rate control: C/w home dose of atenolol. Up titrate if needed  - [id number] score: 4. Given age and h/o falls, would not initiate AC for A fib. Discussed with family who opts against AC.   - Continuous tele monitoring  - Cardiac enzymes q8h x2 to r/o ischemia.  - 2 D echo to evaluate function and to r/o thrombus  - Check thyroid panel 89 yo PMH HTN, HLD, depression, GERD presented with falls x 2 in assisted nursing facility over the past day, found to be in new onset rate controlled atrial fibrillation.     Atrial Fibrillation  - Patient with Afib, rate controlled at present   - New onset, unknown duration  - No Afib and is on atenolol 25 mg at home for HTN  - Rate control: C/w home dose of atenolol. Up titrate if needed  - [id number] score: 4. Given age and h/o falls, would not initiate AC for A fib. Discussed with family who opts against AC.   - Continue aspirin   - Continuous tele monitoring  - Cardiac enzymes q8h x2 to r/o ischemia.  - 2 D echo to evaluate function and to r/o thrombus  - Check thyroid panel     Hypertension  - Continue home dose Norvasc, losartan and atenolol   - Monitor routine hemodynamics     - Monitor and replete lytes, keep K>4, Mg>2.  - Other cardiovascular workup will depend on clinical course.      Ever Coelho, MS FNP, AGAP  Nurse Practitioner- Cardiology   Spectra #[phone] 89 yo PMH HTN, HLD, depression, GERD presented with falls x 2 in assisted nursing facility over the past day, found to be in new onset rate controlled atrial fibrillation.     Atrial Fibrillation  - Patient with Afib, rate controlled at present   - New onset, unknown duration  - No Afib and is on atenolol 25 mg at home for HTN  - Rate control: C/w home dose of atenolol.   - [id number] score: 4. Given age and h/o falls, would not initiate AC for A fib. Discussed with family who opts against AC.   - Continue aspirin   - Continuous tele monitoring  - Check thyroid panel . On synthroid  - Continue home dose Norvasc, losartan and atenolol  - Monitor and replete lytes, keep K>4, Mg>2.  - Patient does not need inpatient management for A fib, given rate is controlled. Unsure if fall is related to A fib. No tachy or pauses noted while on telemetry in ED.   - No signs of acute ischemia  - Does not appear over loaded on examination.       - Other cardiovascular workup will depend on clinical course.  - Would follow if admitted       Ever Coelho MS FNP, [facility]  Nurse Practitioner- Cardiology   Spectra #[phone] 89 yo PMH HTN, HLD, depression, GERD presented with falls x 2 in assisted nursing facility over the past day, found to be in new onset rate controlled atrial fibrillation.     Atrial Fibrillation  - Patient with Afib, rate controlled at present in 90-100s  - New onset, unknown duration  - No Afib and is on atenolol 25 mg at home for HTN  - Rate control: Increase atenolol to 50 mg PO daily   - [id number] score: 4. Given age and h/o falls, would not initiate AC for A fib. Discussed with family who opts against AC.   - Continue aspirin   - Continuous tele monitoring  - Check thyroid panel . On synthroid  - Continue home dose antihypertensives   - Patient does not need inpatient management for A fib, given rate is controlled. Unsure if fall is related to A fib. No tachy or pauses noted while on telemetry in ED.   - No signs of acute ischemia  - Does not appear over loaded on examination.   - No significant valvular abnormalities    - Other cardiovascular workup will depend on clinical course.  - Can follow outpatient for cardiac work up. Would follow if admitted       Ever Coelho MS FNP, AGAP  Nurse Practitioner- Cardiology   Spectra #[phone]

## 2020-06-24 NOTE — ED PROVIDER NOTE - PATIENT PORTAL LINK FT
You can access the FollowMyHealth Patient Portal offered by [facility] by registering at the following website: [email or web address]. By joining [employer]’s FollowMyHealth portal, you will also be able to view your health information using other applications (apps) compatible with our system.

## 2020-06-24 NOTE — ED PROVIDER NOTE - CARE PLAN
Principal Discharge DX:	Head trauma, initial encounter  Secondary Diagnosis:	Closed fracture of one rib of right side, initial encounter  Secondary Diagnosis:	Atrial fibrillation, unspecified type

## 2020-06-24 NOTE — ED PROVIDER NOTE - NSFOLLOWUPINSTRUCTIONS_ED_ALL_ED_FT
Rest  Use Incentive Spirometer 2-3 times per hours while awake for the next 7-days  Deeps breathing  Tylenol and or Motrin for pain if needed  Follow-up with your doctor this week  Return here if needed

## 2020-06-24 NOTE — ED PROVIDER NOTE - CLINICAL SUMMARY MEDICAL DECISION MAKING FREE TEXT BOX
89 yo female with h/o HTN, ESRD, OA from [facility] s/p fall today. As per RN at assisted living [facility] patient had an unwitnessed fall, + head trauma, unknown LOC. PAtient was found by aide sitting on floor next to her chair, on floor approx 1 hour. As per patient she tripped and fell. Patient c/o right posterior rib pain. Denies headache, dizziness, visual changes, chest pain, sob, abd pain, N/V, UE/LE weakness or paresthesias.  No lacerations or abrasions. + hematoma to scalp. As per EMS - ekg shows A fib, no known history of a fib. A/P: ekg, labs, imaging, cards consult

## 2020-06-24 NOTE — ED PROVIDER NOTE - MUSCULOSKELETAL MINIMAL EXAM
+ right posterior lower rib tenderness. no midline vertebral tendenress, no anterior chest wall tenderness. no pelvic tenderness with FROM. no vascular compromise.

## 2020-06-24 NOTE — ED ADULT NURSE NOTE - CHPI ED NUR SYMPTOMS NEG
no loss of consciousness/no nausea/no weakness/no vomiting/no seizure/no syncope/no blurred vision/no change in level of consciousness/no confusion/no dizziness

## 2020-06-25 LAB
T3 SERPL-MCNC: 84 NG/DL — SIGNIFICANT CHANGE UP (ref 80–200)
T4 AB SER-ACNC: 7.2 UG/DL — SIGNIFICANT CHANGE UP (ref 4.6–12)

## 2021-04-21 NOTE — ED PROVIDER NOTE - ATTENDING CONTRIBUTION TO CARE
89 yo white female with falls x 2 in [facility] over the past day. Does not remember feeling dizzy or lightheaded. Denies any chest pain, SOB or syncope. Remembers hitting head and does have mild pain on top of head as well as right side of chest. No neck, back or abdominal pains. Exam revealed white female, elderly in NAD with soft tissue swelling and tenderness at vertex of scalp as well as tenderness to palpation right lateral chest wall. I agree with plan and management outlined by PA. Consent 2/Introductory Paragraph: Mohs surgery was explained to the patient and consent was obtained. The risks, benefits and alternatives to therapy were discussed in detail. Specifically, the risks of infection, scarring, bleeding, prolonged wound healing, incomplete removal, allergy to anesthesia, nerve injury and recurrence were addressed. Prior to the procedure, the treatment site was clearly identified and confirmed by the patient. All components of Universal Protocol/PAUSE Rule completed.

## 2024-05-22 NOTE — CONSULT NOTE ADULT - CONSULT REQUESTED DATE/TIME
24-Jun-2020 16:30
Detail Level: Simple
Render Risk Assessment In Note?: no
Additional Notes: No treatement recommended, advised patient that they should go away on their own.